# Patient Record
Sex: FEMALE | Race: WHITE | URBAN - METROPOLITAN AREA
[De-identification: names, ages, dates, MRNs, and addresses within clinical notes are randomized per-mention and may not be internally consistent; named-entity substitution may affect disease eponyms.]

---

## 2023-03-29 ENCOUNTER — OFFICE VISIT (OUTPATIENT)
Dept: URBAN - METROPOLITAN AREA CLINIC 80 | Facility: CLINIC | Age: 78
End: 2023-03-29
Payer: MEDICARE

## 2023-03-29 DIAGNOSIS — H25.13 AGE-RELATED NUCLEAR CATARACT, BILATERAL: Primary | ICD-10-CM

## 2023-03-29 DIAGNOSIS — H52.223 REGULAR ASTIGMATISM, BILATERAL: ICD-10-CM

## 2023-03-29 PROCEDURE — 99204 OFFICE O/P NEW MOD 45 MIN: CPT | Performed by: OPHTHALMOLOGY

## 2023-03-29 ASSESSMENT — INTRAOCULAR PRESSURE
OS: 19
OD: 17

## 2023-03-29 NOTE — IMPRESSION/PLAN
Impression: Age-related nuclear cataract, bilateral: H25.13. Plan: Cataracts account for patient's complaints. The patient demonstrates impaired visual function believed not to be correctable with a tolerable change in glasses or contact lenses and ADLs are impaired. Discussed cataracts, treatment options, and surgical risks/benefits with patient. Patient elects surgical treatment. Recommend surgery OU, OS first. Aim OD: [place]. Aim OS: -2.50. Recommend ORA. Recommend monofocal IOL, see notes. NOTES: Has not previously trialed monovision ctls. Will proceed with near aim OS first, and if patient adapts with near OS,with OD remaining for distance, proceed with distance aim OD. Otherwise, will proceed with near aim OU. Recommend ORA AM OU.

## 2023-05-04 ENCOUNTER — SURGERY (OUTPATIENT)
Dept: URBAN - METROPOLITAN AREA SURGERY 50 | Facility: SURGERY | Age: 78
End: 2023-05-04
Payer: MEDICARE

## 2023-05-04 DIAGNOSIS — H25.13 AGE-RELATED NUCLEAR CATARACT, BILATERAL: Primary | ICD-10-CM

## 2023-05-04 PROCEDURE — PR1CP PR1CP: CUSTOM | Performed by: OPHTHALMOLOGY

## 2023-05-18 ENCOUNTER — SURGERY (OUTPATIENT)
Dept: URBAN - METROPOLITAN AREA SURGERY 50 | Facility: SURGERY | Age: 78
End: 2023-05-18
Payer: MEDICARE

## 2023-05-18 DIAGNOSIS — H25.11 AGE-RELATED NUCLEAR CATARACT, RIGHT EYE: Primary | ICD-10-CM

## 2023-05-18 PROCEDURE — PR1CP PR1CP: CUSTOM | Performed by: OPHTHALMOLOGY
